# Patient Record
Sex: FEMALE | Race: WHITE | NOT HISPANIC OR LATINO
[De-identification: names, ages, dates, MRNs, and addresses within clinical notes are randomized per-mention and may not be internally consistent; named-entity substitution may affect disease eponyms.]

---

## 2020-02-05 ENCOUNTER — FORM ENCOUNTER (OUTPATIENT)
Age: 70
End: 2020-02-05

## 2020-02-11 ENCOUNTER — FORM ENCOUNTER (OUTPATIENT)
Age: 70
End: 2020-02-11

## 2020-02-12 ENCOUNTER — FORM ENCOUNTER (OUTPATIENT)
Age: 70
End: 2020-02-12

## 2020-02-12 ENCOUNTER — HOSPITAL ENCOUNTER (OUTPATIENT)
Dept: HOSPITAL 74 - JASU-SURG | Age: 70
Discharge: HOME | End: 2020-02-12
Attending: SURGERY
Payer: COMMERCIAL

## 2020-02-12 DIAGNOSIS — C50.411: Primary | ICD-10-CM

## 2020-02-12 DIAGNOSIS — Z17.0: ICD-10-CM

## 2020-02-12 DIAGNOSIS — C50.212: ICD-10-CM

## 2020-02-12 PROCEDURE — 0HBV3ZX EXCISION OF BILATERAL BREAST, PERCUTANEOUS APPROACH, DIAGNOSTIC: ICD-10-PCS

## 2020-02-12 PROCEDURE — A4648 IMPLANTABLE TISSUE MARKER: HCPCS

## 2020-02-13 NOTE — PATH
Surgical Pathology Report



Patient Name:  ZOYA BROWN

Accession #:  

[facility]. Rec. #:  [id number]                                                        

   /Age/Gender:  1950 (Age: 69) / F

Account:  [id number]                                                          

             Location: [facility] SURGICAL

Taken:  2020

Received:  2020

Reported:  2020

Physicians:  DANA Rose M.D.

  



Specimen(s) Received

A: BREAST, RIGHT, 11:00, ULTRASOUND GUIDED CORE BIOPSY 

B: BREAST, LEFT, 11:00, ULTRASOUND GUIDED CORE BIOPSY 





Clinical History

Left palpable mass, right nonpalpable lesion

Mammographic findings, ultrasound findings: Highly suspicious less malignant







Final Diagnosis

A. BREAST, RIGHT, 11:00, ULTRASOUND GUIDED CORE BIOPSY:

INVASIVE DUCTAL CARCINOMA, WELL DIFFERENTIATED, WITH ASSOCIATED

MICROCALCIFICATIONS MEASURING AT LEAST 1 CM IN THIS MATERIAL.

DUCTAL CARCINOMA IN SITU (DCIS), LOW NUCLEAR GRADE, CRIBRIFORM TYPE, WITH

ASSOCIATED MICROCALCIFICATIONS.



B. BREAST, LEFT, 11:00, ULTRASOUND GUIDED CORE BIOPSY:

INVASIVE DUCTAL CARCINOMA, MODERATELY DIFFERENTIATED, WITH ASSOCIATED

MICROCALCIFICATIONS, MEASURING AT LEAST 1.3 CM IN THIS MATERIAL.



Comment: Breast biomarkers pending, findings will be reported separately.

Findings discussed with Dr. Reyna, 20.







***Electronically Signed***

Denise Miranda M.D.



Amendments

Amended:  2020      Previous Signout Date:  2020





Addendum     

Reported: 2020



Addendum Diagnosis

Part A, Right breast

Breast Biomarker studies performed on block "A" at [facility] [place], [place]

([id number]) are as follows:

ER (clone [id number] mouse monoclonal antibody by [employer]): 100% nuclear staining with

strong intensity (Positive).

DC (clone16 mouse monoclonal antibody by [employer]): ~20% nuclear staining with

strong intensity (Positive).

Her2 IHC (EP3 from [employer], formerly known as [id number], using Bond Polymer Refine

detection kit): 2+ (Equivocal).

Ki-67: ~5% (Low proliferative index).



Part B, Left breast

Breast Biomarker studies performed on block "B" at [place], [place]

([id number]) are as follows:

ER (clone 6F11 mouse monoclonal antibody by [employer]): 100% nuclear staining with

strong intensity (Positive).

DC (clone16 mouse monoclonal antibody by [employer]): ~70% nuclear staining with

strong intensity (Positive).

Her2 IHC (EP3 from [employer], formerly known as [id number], using Bond Polymer Refine

detection kit): 2+ (Equivocal).

Ki-67: ~20% (Moderate proliferative index).



HER2 by FISH pending, and findings will be reported separately.





Positive and negative controls (internal if applicable) show appropriate

results.

Formalin fixation and cold ischemic times are within current ASCO/CAP

recommendations for ER, DC and Her2 testing.





 Denise Miranda M.D.  

Addendum     

Reported: 2020



Addendum Diagnosis

HER2 ANALYSIS BY FISH ([id number], [id number]) performed and interpreted

at [facility] shows the following:



Right Breast (Block A)



INTERPRETATION: 

Negative for Her2 amplification





Probe 

Average number of signals/nucleus 

Ratio 

Her2 

2.6 

1.3 

CEP17 

2.0  





Left Breast (Block B) 



INTERPRETATION: 

Negative for Her2 amplification





Probe 

Average number of signals/nucleus 

Ratio 

Her2 

4.6 

1.2 

CEP17 

3.9  

See [facility] reports for additional details.



Addendum Comment











 Denise Miranda M.D.  

 



Gross Description

A. Received in formalin labeled "right 11:00," are 8 tan-yellow, cylindrical

portions of fibroadipose tissue ranging from 0.2-1.4 cm in length and averaging

0.1 cm in diameter. The specimens are submitted in toto in one cassette.



B. Received in formalin labeled "left 11:00," are 4 tan-yellow, cylindrical

portions of fibroadipose tissue ranging from 0.3-1.7 cm in length and averaging

0.1 cm in diameter. The specimens are submitted in toto in one cassette.



Time to formalin fixation: Less than one minute

Total formalin fixation time: Approximately 6 hours.

## 2020-02-25 ENCOUNTER — FORM ENCOUNTER (OUTPATIENT)
Age: 70
End: 2020-02-25

## 2020-03-25 ENCOUNTER — FORM ENCOUNTER (OUTPATIENT)
Age: 70
End: 2020-03-25

## 2020-05-07 ENCOUNTER — FORM ENCOUNTER (OUTPATIENT)
Age: 70
End: 2020-05-07

## 2020-05-11 ENCOUNTER — FORM ENCOUNTER (OUTPATIENT)
Age: 70
End: 2020-05-11

## 2020-05-19 ENCOUNTER — FORM ENCOUNTER (OUTPATIENT)
Age: 70
End: 2020-05-19

## 2020-06-02 ENCOUNTER — FORM ENCOUNTER (OUTPATIENT)
Age: 70
End: 2020-06-02

## 2020-08-25 ENCOUNTER — FORM ENCOUNTER (OUTPATIENT)
Age: 70
End: 2020-08-25

## 2020-09-22 ENCOUNTER — FORM ENCOUNTER (OUTPATIENT)
Age: 70
End: 2020-09-22

## 2021-02-02 ENCOUNTER — NON-APPOINTMENT (OUTPATIENT)
Age: 71
End: 2021-02-02

## 2021-02-02 PROBLEM — Z00.00 ENCOUNTER FOR PREVENTIVE HEALTH EXAMINATION: Status: ACTIVE | Noted: 2021-02-02

## 2021-02-26 ENCOUNTER — NON-APPOINTMENT (OUTPATIENT)
Age: 71
End: 2021-02-26

## 2021-03-01 DIAGNOSIS — C50.411 MALIGNANT NEOPLASM OF UPPER-OUTER QUADRANT OF RIGHT FEMALE BREAST: ICD-10-CM

## 2021-03-01 DIAGNOSIS — C50.212 MALIGNANT NEOPLASM OF UPPER-INNER QUADRANT OF LEFT FEMALE BREAST: ICD-10-CM

## 2021-03-01 DIAGNOSIS — Z92.3 PERSONAL HISTORY OF IRRADIATION: ICD-10-CM

## 2021-03-01 RX ORDER — ANASTROZOLE 1 MG/1
1 TABLET ORAL
Refills: 0 | Status: ACTIVE | COMMUNITY

## 2021-03-04 ENCOUNTER — APPOINTMENT (OUTPATIENT)
Dept: BREAST CENTER | Facility: CLINIC | Age: 71
End: 2021-03-04
Payer: MEDICARE

## 2021-03-04 VITALS — BODY MASS INDEX: 35.44 KG/M2 | WEIGHT: 200 LBS | HEIGHT: 63 IN

## 2021-03-04 DIAGNOSIS — Z87.891 PERSONAL HISTORY OF NICOTINE DEPENDENCE: ICD-10-CM

## 2021-03-04 PROCEDURE — 99214 OFFICE O/P EST MOD 30 MIN: CPT | Mod: 25

## 2021-03-04 PROCEDURE — 19000 PUNCTURE ASPIR CYST BREAST: CPT

## 2021-03-04 NOTE — PHYSICAL EXAM
[Normocephalic] : normocephalic [Atraumatic] : atraumatic [EOMI] : extra ocular movement intact [Supple] : supple [No Supraclavicular Adenopathy] : no supraclavicular adenopathy [No Cervical Adenopathy] : no cervical adenopathy [Examined in the supine and seated position] : examined in the supine and seated position [No dominant masses] : no dominant masses left breast [No Nipple Retraction] : no left nipple retraction [No Nipple Discharge] : no left nipple discharge [___cm] : ~M [unfilled] ~Ucm upper outer quadrant mass [Breast Mass Left Breast ___cm] : no masses [Breast Nipple Inversion] : nipples not inverted [Breast Nipple Retraction] : nipples not retracted [Breast Nipple Flattening] : nipples not flattened [Breast Nipple Fissures] : nipples not fissured [Breast Abnormal Lactation (Galactorrhea)] : no galactorrhea [Breast Abnormal Secretion Bloody Fluid] : no bloody discharge [Breast Abnormal Secretion Serous Fluid] : no serous discharge [Breast Abnormal Secretion Opalescent Fluid] : no milky discharge [No Axillary Lymphadenopathy] : no left axillary lymphadenopathy [No Edema] : no edema [No Rashes] : no rashes [No Ulceration] : no ulceration [de-identified] : On exam, the patient has a obvious bilateral partial mastectomy incisions in the upper inner aspect of the left breast and upper outer aspect of the right breast.  On palpation, she has typical scarring changes underneath the wide excision with some more nodularity underneath the right breast incision.  I performed a directed ultrasound and she did have a seroma and I aspirated about 19 cc of bloody seroma with complete resolution under ultrasound guidance.  She has no axillary, supraclavicular, or cervical adenopathy. [de-identified] : Right breast partial mastectomy incision with nodularity and seroma which was aspirated under ultrasound guidance in the office today. [de-identified] : Left breast partial mastectomy incision with mild fibrosis.

## 2021-03-04 NOTE — HISTORY OF PRESENT ILLNESS
[FreeTextEntry1] : The patient is a 70-year-old postmenopausal nulliparous white female of Lao, Yi, and Frisian descent.  She underwent menopause at age 51 and never took any hormone replacement therapy.  She never had any breast biopsies or any atypia.  She has no family history of breast or ovarian cancer.  She felt a palpable mass in the upper inner aspect of the left breast in December 2019 and underwent a bilateral mammography on February 3, 2020 at [facility] still imaging showing suspicious spiculated densities in the left breast upper inner quadrant and right breast upper outer quadrant.  Diagnostic mammography and ultrasound performed on February 5, 2020 showed a left breast 2.9 x 2.1 x 1.9 cm mass in the left breast 11:00 region 9 cm from the nipple and a right breast 1.3 x 1.1 x 1.4 cm irregular mass in the 11 o'clock position 9 cm from the nipple.  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  Both tumors were ER/MA positive HER-2/galo negative.  MRI showed localized disease and her surgery was delayed due to the COVID crisis and she was placed on Arimidex.  We took her to the operating room on May 12, 2020 for bilateral partial mastectomies, bilateral sentinel lymph node biopsies, and bilateral intraoperative radiation.  Final pathology on the right showed a 1.6 cm invasive duct cancer with 1 out of 2 positive sentinel nodes with macrometastasis measuring 1.6 cm.  The left breast cancer was a moderately differentiated cancer measuring 2.1 cm with 1 negative sentinel lymph node.  Both tumors were pathologic prognostic stage IA breast cancers.  She had no extensive intraductal component.  She was seen by Dr. Small and underwent an Oncotype in the right breast cancer had an Oncotype score of 13 and the left breast cancer had an Oncotype score 14.  She was just kept on Arimidex and did undergo external beam radiation over the right breast through Dr. Zuleta to which finished on August 3, 2020.  She comes in for routine follow-up but has noted some nodularity underneath the right breast wide excision site.

## 2021-03-04 NOTE — PAST MEDICAL HISTORY
[Postmenopausal] : The patient is postmenopausal [Menopause Age____] : age at menopause was [unfilled] [History of Hormone Replacement Treatment] : has no history of hormone replacement treatment [Total Preg ___] : G[unfilled] [Live Births ___] : P[unfilled]

## 2021-03-04 NOTE — ASSESSMENT
[FreeTextEntry1] : The patient is a 70-year-old postmenopausal nulliparous white female of Sinhala, Kyrgyz, and Faroese descent.  She underwent menopause at age 51 and never took any hormone replacement therapy.  She never had any breast biopsies or any atypia.  She has no family history of breast or ovarian cancer.  She felt a palpable mass in the upper inner aspect of the left breast in December 2019 and underwent a bilateral mammography on February 3, 2020 at [facility] still imaging showing suspicious spiculated densities in the left breast upper inner quadrant and right breast upper outer quadrant.  Diagnostic mammography and ultrasound performed on February 5, 2020 showed a left breast 2.9 x 2.1 x 1.9 cm mass in the left breast 11:00 region 9 cm from the nipple and a right breast 1.3 x 1.1 x 1.4 cm irregular mass in the 11 o'clock position 9 cm from the nipple.  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  Both tumors were ER/FL positive HER-2/galo negative.  MRI showed localized disease and her surgery was delayed due to the COVID crisis and she was placed on Arimidex.  We took her to the operating room on May 12, 2020 for bilateral partial mastectomies, bilateral sentinel lymph node biopsies, and bilateral intraoperative radiation.  Final pathology on the right showed a 1.6 cm invasive duct cancer with 1 out of 2 positive sentinel nodes with macrometastasis measuring 1.6 cm.  The left breast cancer was a moderately differentiated cancer measuring 2.1 cm with 1 negative sentinel lymph node.  Both tumors were pathologic prognostic stage IA breast cancers.  She had no extensive intraductal component.  She was seen by Dr. Small and underwent an Oncotype in the right breast cancer had an Oncotype score of 13 and the left breast cancer had an Oncotype score 14.  She was just kept on Arimidex and did undergo external beam radiation over the right breast through Dr. Zuleta to which finished on August 3, 2020.  On exam, she has some fibrosis underneath both wide excision scars with some nodularity underneath the right breast upper outer quadrant incision.  I performed a directed ultrasound and she did have a seroma and I aspirated about 19 cc of bloody seroma with complete resolution under ultrasound guidance.  The patient was reassured and is due for a mammography and ultrasound now which needs to be delayed about a month because she just underwent her COVID vaccination.  If that is unchanged and negative she should follow-up again in 6 months around September 2021.  Will remain on Arimidex.

## 2021-03-04 NOTE — REASON FOR VISIT
[Follow-Up: _____] : a [unfilled] follow-up visit [FreeTextEntry1] : The patient comes in for routine follow-up with a history of bilateral synchronous breast cancers diagnosed at the beginning of 2020 in the right breast upper outer quadrant and left breast upper inner quadrant for which she underwent bilateral partial mastectomies, bilateral sentinel lymph node biopsies, and bilateral intraoperative radiation which was performed on May 12, 2020.  Final pathology showed bilateral pathologic prognostic stage Ia breast cancers and she did have a positive sentinel lymph node on the right side and ended up getting external beam radiation on the right side.  Oncotype recurrence score was 13 and 14 and she was just placed on Arimidex.

## 2021-04-01 ENCOUNTER — NON-APPOINTMENT (OUTPATIENT)
Age: 71
End: 2021-04-01

## 2021-04-05 ENCOUNTER — RESULT REVIEW (OUTPATIENT)
Age: 71
End: 2021-04-05

## 2021-04-05 ENCOUNTER — TRANSCRIPTION ENCOUNTER (OUTPATIENT)
Age: 71
End: 2021-04-05

## 2021-04-05 ENCOUNTER — APPOINTMENT (OUTPATIENT)
Dept: BREAST CENTER | Facility: CLINIC | Age: 71
End: 2021-04-05
Payer: MEDICARE

## 2021-04-05 VITALS — BODY MASS INDEX: 35.44 KG/M2 | HEIGHT: 63 IN | WEIGHT: 200 LBS

## 2021-04-05 DIAGNOSIS — N63.0 UNSPECIFIED LUMP IN UNSPECIFIED BREAST: ICD-10-CM

## 2021-04-05 DIAGNOSIS — Z90.13 ACQUIRED ABSENCE OF BILATERAL BREASTS AND NIPPLES: ICD-10-CM

## 2021-04-05 PROCEDURE — 99214 OFFICE O/P EST MOD 30 MIN: CPT

## 2021-04-05 NOTE — HISTORY OF PRESENT ILLNESS
[FreeTextEntry1] : The patient is a 71-year-old postmenopausal nulliparous white female of Wolof, Kiswahili, and Greenlandic descent.  She underwent menopause at age 51 and never took any hormone replacement therapy.  She never had any breast biopsies or any atypia.  She has no family history of breast or ovarian cancer.  She felt a palpable mass in the upper inner aspect of the left breast in December 2019 and underwent a bilateral mammography on February 3, 2020 at [facility] still [facility] showing suspicious spiculated densities in the left breast upper inner quadrant and right breast upper outer quadrant.  Diagnostic mammography and ultrasound performed on February 5, 2020 showed a left breast 2.9 x 2.1 x 1.9 cm mass in the left breast 11:00 region 9 cm from the nipple and a right breast 1.3 x 1.1 x 1.4 cm irregular mass in the 11 o'clock position 9 cm from the nipple.  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  Both tumors were ER/LA positive HER-2/galo negative.  MRI showed localized disease and her surgery was delayed due to the COVID crisis and she was placed on Arimidex.  We took her to the operating room on May 12, 2020 for bilateral partial mastectomies, bilateral sentinel lymph node biopsies, and bilateral intraoperative radiation.  Final pathology on the right showed a 1.6 cm invasive duct cancer with 1 out of 2 positive sentinel nodes with macrometastasis measuring 1.6 cm.  The left breast cancer was a moderately differentiated cancer measuring 2.1 cm with 1 negative sentinel lymph node.  Both tumors were pathologic prognostic stage IA breast cancers.  She had no extensive intraductal component.  She was seen by Dr. Small and underwent an Oncotype in the right breast cancer had an Oncotype score of 13 and the left breast cancer had an Oncotype score 14.  She was just kept on Arimidex and did undergo external beam radiation over the right breast through Dr. Zuleta to which finished on August 3, 2020.  She underwent a bilateral mammography and ultrasound on April 5, 2021 which showed a 6 mm hypoechoic density around the right breast 8-9 o'clock region 5 cm from the nipple for which ultrasound-guided core biopsy is being recommended.

## 2021-04-05 NOTE — ASSESSMENT
[FreeTextEntry1] : The patient is a 71-year-old postmenopausal nulliparous white female of Tajik, Serbian, and Maltese descent.  She underwent menopause at age 51 and never took any hormone replacement therapy.  She never had any breast biopsies or any atypia.  She has no family history of breast or ovarian cancer.  She felt a palpable mass in the upper inner aspect of the left breast in December 2019 and underwent a bilateral mammography on February 3, 2020 at [place] still [facility] showing suspicious spiculated densities in the left breast upper inner quadrant and right breast upper outer quadrant.  Diagnostic mammography and ultrasound performed on February 5, 2020 showed a left breast 2.9 x 2.1 x 1.9 cm mass in the left breast 11:00 region 9 cm from the nipple and a right breast 1.3 x 1.1 x 1.4 cm irregular mass in the 11 o'clock position 9 cm from the nipple.  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  Both tumors were ER/WI positive HER-2/galo negative.  MRI showed localized disease and her surgery was delayed due to the COVID crisis and she was placed on Arimidex.  We took her to the operating room on May 12, 2020 for bilateral partial mastectomies, bilateral sentinel lymph node biopsies, and bilateral intraoperative radiation.  Final pathology on the right showed a 1.6 cm invasive duct cancer with 1 out of 2 positive sentinel nodes with macrometastasis measuring 1.6 cm.  The left breast cancer was a moderately differentiated cancer measuring 2.1 cm with 1 negative sentinel lymph node.  Both tumors were pathologic prognostic stage IA breast cancers.  She had no extensive intraductal component.  She was seen by Dr. Small and underwent an Oncotype in the right breast cancer had an Oncotype score of 13 and the left breast cancer had an Oncotype score 14.  She was just kept on Arimidex and did undergo external beam radiation over the right breast through Dr. Zuleta to which finished on August 3, 2020.  On exam, she has some fibrosis underneath both wide excision scars with some nodularity underneath the right breast upper outer quadrant incision.  Underwent a bilateral mammography and ultrasound here at [facility] on April 5, 2021 showing some nodularity around the 8:00 region of the right breast measure about 6 mm in size 5 cm from the nipple for which ultrasound-guided core biopsy is being recommended.  I reviewed the films and I think this is fat necrosis but I agree with the recommendation.  If this is negative, she should follow-up in 6 months around November 2021 and will be due for another bilateral diagnostic mammography and directed right breast ultrasound at that time.  She will remain on Arimidex.

## 2021-04-05 NOTE — REASON FOR VISIT
[Follow-Up: _____] : a [unfilled] follow-up visit [FreeTextEntry1] : The patient comes in for an interval follow-up with a history of bilateral synchronous breast cancers diagnosed at the beginning of 2020 in the right breast upper outer quadrant and left breast upper inner quadrant for which she underwent bilateral partial mastectomies, bilateral sentinel lymph node biopsies, and bilateral intraoperative radiation which was performed on May 12, 2020.  Final pathology showed bilateral pathologic prognostic stage IA breast cancers and she did have a positive sentinel lymph node on the right side and ended up getting external beam radiation on the right side.  Oncotype recurrence score was 13 and 14 and she was just placed on Arimidex.

## 2021-04-05 NOTE — PHYSICAL EXAM
[Normocephalic] : normocephalic [Atraumatic] : atraumatic [EOMI] : extra ocular movement intact [Supple] : supple [No Supraclavicular Adenopathy] : no supraclavicular adenopathy [No Cervical Adenopathy] : no cervical adenopathy [Examined in the supine and seated position] : examined in the supine and seated position [No dominant masses] : no dominant masses in right breast  [No dominant masses] : no dominant masses left breast [No Nipple Retraction] : no left nipple retraction [No Nipple Discharge] : no left nipple discharge [Breast Mass Left Breast ___cm] : no masses [Breast Nipple Inversion] : nipples not inverted [Breast Nipple Retraction] : nipples not retracted [Breast Nipple Flattening] : nipples not flattened [Breast Nipple Fissures] : nipples not fissured [Breast Abnormal Lactation (Galactorrhea)] : no galactorrhea [Breast Abnormal Secretion Bloody Fluid] : no bloody discharge [Breast Abnormal Secretion Serous Fluid] : no serous discharge [Breast Abnormal Secretion Opalescent Fluid] : no milky discharge [No Axillary Lymphadenopathy] : no left axillary lymphadenopathy [No Edema] : no edema [No Rashes] : no rashes [No Ulceration] : no ulceration [de-identified] : On exam, the patient has a obvious bilateral partial mastectomy incisions in the upper inner aspect of the left breast and upper outer aspect of the right breast.  On palpation, she has typical scarring changes underneath the wide excision with some more nodularity underneath the right breast incision.  She has no axillary, supraclavicular, or cervical adenopathy. [de-identified] : Right breast partial mastectomy incision with nodularity in lower outer quadrant. [de-identified] : Left breast partial mastectomy incision with mild fibrosis.

## 2021-04-13 ENCOUNTER — RESULT REVIEW (OUTPATIENT)
Age: 71
End: 2021-04-13

## 2021-04-15 ENCOUNTER — NON-APPOINTMENT (OUTPATIENT)
Age: 71
End: 2021-04-15

## 2021-10-18 DIAGNOSIS — N63.10 UNSPECIFIED LUMP IN THE RIGHT BREAST, UNSPECIFIED QUADRANT: ICD-10-CM

## 2021-10-19 ENCOUNTER — NON-APPOINTMENT (OUTPATIENT)
Age: 71
End: 2021-10-19

## 2021-10-20 ENCOUNTER — RESULT REVIEW (OUTPATIENT)
Age: 71
End: 2021-10-20

## 2021-10-20 ENCOUNTER — APPOINTMENT (OUTPATIENT)
Dept: BREAST CENTER | Facility: CLINIC | Age: 71
End: 2021-10-20
Payer: MEDICARE

## 2021-10-20 PROCEDURE — 99213 OFFICE O/P EST LOW 20 MIN: CPT

## 2021-10-20 NOTE — PAST MEDICAL HISTORY
[Postmenopausal] : The patient is postmenopausal [Menopause Age____] : age at menopause was [unfilled] [Total Preg ___] : G[unfilled] [Live Births ___] : P[unfilled]  [History of Hormone Replacement Treatment] : has no history of hormone replacement treatment

## 2021-10-20 NOTE — ASSESSMENT
[FreeTextEntry1] : The patient is a 71-year-old postmenopausal nulliparous white female of Malay, Syriac, and Pashto descent.  She underwent menopause at age 51 and never took any hormone replacement therapy.  She never had any breast biopsies or any atypia.  She has no family history of breast or ovarian cancer.  She felt a palpable mass in the upper inner aspect of the left breast in December 2019 and underwent a bilateral mammography on February 3, 2020 at [facility] still [facility] showing suspicious spiculated densities in the left breast upper inner quadrant and right breast upper outer quadrant.  Diagnostic mammography and ultrasound performed on February 5, 2020 showed a left breast 2.9 x 2.1 x 1.9 cm mass in the left breast 11:00 region 9 cm from the nipple and a right breast 1.3 x 1.1 x 1.4 cm irregular mass in the 11 o'clock position 9 cm from the nipple.  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  Both tumors were ER/MT positive HER-2/galo negative.  MRI showed localized disease and her surgery was delayed due to the COVID crisis and she was placed on Arimidex.  We took her to the operating room on May 12, 2020 for bilateral partial mastectomies, bilateral sentinel lymph node biopsies, and bilateral intraoperative radiation.  Final pathology on the right showed a 1.6 cm invasive duct cancer with 1 out of 2 positive sentinel nodes with macrometastasis measuring 1.6 cm.  The left breast cancer was a moderately differentiated cancer measuring 2.1 cm with 1 negative sentinel lymph node.  Both tumors were pathologic prognostic stage IA breast cancers.  She had no extensive intraductal component.  She was seen by Dr. Small and underwent an Oncotype and the right breast cancer had an Oncotype score of 13 and the left breast cancer had an Oncotype score 14.  She was just kept on Arimidex and did undergo external beam radiation over the right breast through Dr. Zuleta to which finished on August 3, 2020.  On exam, she has some fibrosis underneath both wide excision scars with some nodularity underneath the right breast upper outer quadrant incision can Elliot to intraoperative and external beam radiation over the right breast.  She underwent a bilateral mammography and ultrasound at [facility] on April 5, 2021 showing bilateral scarring changes from her partial mastectomies and some nodularity around the 8:00 region of the right breast measuring about 6 mm in size 5 cm from the nipple for which ultrasound-guided core biopsy performed on April 13, 2021 just showing fibroadipose tissue with fat necrosis and granulation tissue.  The patient underwent a diagnostic bilateral mammography and right breast ultrasound today on October 20, 2021 showing stable changes.  She was reassured and should follow-up again in 6 months in April 2022 and her routine bilateral mammography and ultrasound will be due at that time and she was given prescriptions.  She will remain on Arimidex.

## 2021-10-20 NOTE — PHYSICAL EXAM
[Normocephalic] : normocephalic [Atraumatic] : atraumatic [EOMI] : extra ocular movement intact [Supple] : supple [No Supraclavicular Adenopathy] : no supraclavicular adenopathy [No Cervical Adenopathy] : no cervical adenopathy [Examined in the supine and seated position] : examined in the supine and seated position [No dominant masses] : no dominant masses in right breast  [No dominant masses] : no dominant masses left breast [No Nipple Retraction] : no left nipple retraction [No Nipple Discharge] : no left nipple discharge [Breast Mass Left Breast ___cm] : no masses [No Axillary Lymphadenopathy] : no left axillary lymphadenopathy [No Edema] : no edema [No Rashes] : no rashes [No Ulceration] : no ulceration [Breast Nipple Inversion] : nipples not inverted [Breast Nipple Retraction] : nipples not retracted [Breast Nipple Flattening] : nipples not flattened [Breast Nipple Fissures] : nipples not fissured [Breast Abnormal Lactation (Galactorrhea)] : no galactorrhea [Breast Abnormal Secretion Bloody Fluid] : no bloody discharge [Breast Abnormal Secretion Serous Fluid] : no serous discharge [Breast Abnormal Secretion Opalescent Fluid] : no milky discharge [de-identified] : On exam, the patient has a obvious bilateral partial mastectomy incisions in the upper inner aspect of the left breast and upper outer aspect of the right breast.  On palpation, she has typical scarring changes underneath the wide excision with some more nodularity underneath the right breast incision.  She has no axillary, supraclavicular, or cervical adenopathy. [de-identified] : Right breast partial mastectomy incision with nodularity in lower outer quadrant. [de-identified] : Left breast partial mastectomy incision with mild fibrosis.

## 2021-10-20 NOTE — HISTORY OF PRESENT ILLNESS
[FreeTextEntry1] : The patient is a 71-year-old postmenopausal nulliparous white female of Ukrainian, Icelandic, and Romansh descent.  She underwent menopause at age 51 and never took any hormone replacement therapy.  She never had any breast biopsies or any atypia.  She has no family history of breast or ovarian cancer.  She felt a palpable mass in the upper inner aspect of the left breast in December 2019 and underwent a bilateral mammography on February 3, 2020 at [facility] still [facility] showing suspicious spiculated densities in the left breast upper inner quadrant and right breast upper outer quadrant.  Diagnostic mammography and ultrasound performed on February 5, 2020 showed a left breast 2.9 x 2.1 x 1.9 cm mass in the left breast 11:00 region 9 cm from the nipple and a right breast 1.3 x 1.1 x 1.4 cm irregular mass in the 11 o'clock position 9 cm from the nipple.  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  Both tumors were ER/[place] positive HER-2/galo negative.  MRI showed localized disease and her surgery was delayed due to the COVID crisis and she was placed on Arimidex.  We took her to the operating room on May 12, 2020 for bilateral partial mastectomies, bilateral sentinel lymph node biopsies, and bilateral intraoperative radiation.  Final pathology on the right showed a 1.6 cm invasive duct cancer with 1 out of 2 positive sentinel nodes with macrometastasis measuring 1.6 cm.  The left breast cancer was a moderately differentiated cancer measuring 2.1 cm with 1 negative sentinel lymph node.  Both tumors were pathologic prognostic stage IA breast cancers.  She had no extensive intraductal component.  She was seen by Dr. Small and underwent an Oncotype in the right breast cancer had an Oncotype score of 13 and the left breast cancer had an Oncotype score 14.  She was just kept on Arimidex and did undergo external beam radiation over the right breast through Dr. Zuleta to which finished on August 3, 2020.  She underwent a bilateral mammography and ultrasound on April 5, 2021 which showed a 6 mm hypoechoic density around the right breast 8-9 o'clock region 5 cm from the nipple for which ultrasound-guided core biopsy was performed on April 13, 2021 just showing fibroadipose tissue with fat necrosis and granulation tissue.  She comes in now for routine follow-up and continues to get yearly mammography and ultrasound.

## 2022-04-22 ENCOUNTER — APPOINTMENT (OUTPATIENT)
Dept: BREAST CENTER | Facility: CLINIC | Age: 72
End: 2022-04-22
Payer: MEDICARE

## 2022-04-22 ENCOUNTER — RESULT REVIEW (OUTPATIENT)
Age: 72
End: 2022-04-22

## 2022-04-22 DIAGNOSIS — R92.1 MAMMOGRAPHIC CALCIFICATION FOUND ON DIAGNOSTIC IMAGING OF BREAST: ICD-10-CM

## 2022-04-22 PROCEDURE — 99213 OFFICE O/P EST LOW 20 MIN: CPT

## 2022-04-22 NOTE — ASSESSMENT
[FreeTextEntry1] : The patient is a 72-year-old postmenopausal nulliparous white female of Welsh, Nepali, and German descent.  She underwent menopause at age 51 and never took any hormone replacement therapy.  She never had any breast biopsies or any atypia.  She has no family history of breast or ovarian cancer.  She felt a palpable mass in the upper inner aspect of the left breast in December 2019 and underwent a bilateral mammography on February 3, 2020 at [facility] still [facility] showing suspicious spiculated densities in the left breast upper inner quadrant and right breast upper outer quadrant.  Diagnostic mammography and ultrasound performed on February 5, 2020 showed a left breast 2.9 x 2.1 x 1.9 cm mass in the left breast 11:00 region 9 cm from the nipple and a right breast 1.3 x 1.1 x 1.4 cm irregular mass in the 11 o'clock position 9 cm from the nipple.  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  Both tumors were ER/OR positive HER-2/galo negative.  MRI showed localized disease and her surgery was delayed due to the COVID crisis and she was placed on Arimidex.  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  We took her to the operating room on May 12, 2020 for bilateral partial mastectomies, bilateral sentinel lymph node biopsies, and bilateral intraoperative radiation.  Final pathology on the right showed a 1.6 cm invasive duct cancer with 1 out of 2 positive sentinel nodes with macrometastasis measuring 1.6 cm.  The left breast cancer was a moderately differentiated cancer measuring 2.1 cm with 1 negative sentinel lymph node.  Both tumors were pathologic prognostic stage IA breast cancers.  She had no extensive intraductal component.  She was seen by Dr. Small and underwent an Oncotype and the right breast cancer had an Oncotype score of 13 and the left breast cancer had an Oncotype score 14.  She was just kept on Arimidex and did undergo external beam radiation over the right breast through Dr. Zuleta to which finished on August 3, 2020.  She underwent a bilateral mammography and ultrasound at [facility] on April 5, 2021 showing bilateral scarring changes from her partial mastectomies and some nodularity around the 8:00 region of the right breast measuring about 6 mm in size 5 cm from the nipple for which ultrasound-guided core biopsy performed on April 13, 2021 just showing fibroadipose tissue with fat necrosis and granulation tissue.  The patient underwent another bilateral mammography and right breast ultrasound on October 20, 2021 showing stable changes.   She then underwent a follow-up diagnostic right breast mammography which was reviewed from April 22, 2022 showing some probable benign calcifications in the upper outer aspect of the right breast for which follow-up mammography is again recommended in 6 months.   On exam, she has some fibrosis underneath both wide excision scars with some nodularity underneath the right breast upper outer quadrant incision due to the intraoperative and external beam radiation over the right breast.  She was reassured and should follow-up again in 6 months in October 2022 and will be due for her bilateral mammography and ultrasound at that time and she was given prescriptions.  She will remain on Arimidex.

## 2022-04-22 NOTE — HISTORY OF PRESENT ILLNESS
[FreeTextEntry1] : The patient is a 72-year-old postmenopausal nulliparous white female of Danish, German, and Czech descent.  She underwent menopause at age 51 and never took any hormone replacement therapy.  She never had any breast biopsies or any atypia.  She has no family history of breast or ovarian cancer.  She felt a palpable mass in the upper inner aspect of the left breast in December 2019 and underwent a bilateral mammography on February 3, 2020 at [facility] still imaging showing suspicious spiculated densities in the left breast upper inner quadrant and right breast upper outer quadrant.  Diagnostic mammography and ultrasound performed on February 5, 2020 showed a left breast 2.9 x 2.1 x 1.9 cm mass in the left breast 11:00 region 9 cm from the nipple and a right breast 1.3 x 1.1 x 1.4 cm irregular mass in the 11 o'clock position 9 cm from the nipple.  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  Both tumors were ER/[place] positive HER-2/galo negative.  MRI showed localized disease and her surgery was delayed due to the COVID crisis and she was placed on Arimidex.   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  We took her to the operating room on May 12, 2020 for bilateral partial mastectomies, bilateral sentinel lymph node biopsies, and bilateral intraoperative radiation.  Final pathology on the right showed a 1.6 cm invasive duct cancer with 1 out of 2 positive sentinel nodes with macrometastasis measuring 1.6 cm.  The left breast cancer was a moderately differentiated cancer measuring 2.1 cm with 1 negative sentinel lymph node.  Both tumors were pathologic prognostic stage IA breast cancers.  She had no extensive intraductal component.  She was seen by Dr. Small and underwent an Oncotype in the right breast cancer had an Oncotype score of 13 and the left breast cancer had an Oncotype score 14.  She was just kept on Arimidex and did undergo external beam radiation over the right breast through Dr. Zuleta to which finished on August 3, 2020.  She underwent a bilateral mammography and ultrasound on April 5, 2021 which showed a 6 mm hypoechoic density around the right breast 8-9 o'clock region 5 cm from the nipple for which ultrasound-guided core biopsy was performed on April 13, 2021 just showing fibroadipose tissue with fat necrosis and granulation tissue.  She comes in now for routine follow-up and continues to get yearly mammography and ultrasound.

## 2022-04-22 NOTE — PHYSICAL EXAM
[Normocephalic] : normocephalic [Atraumatic] : atraumatic [EOMI] : extra ocular movement intact [Supple] : supple [No Supraclavicular Adenopathy] : no supraclavicular adenopathy [No Cervical Adenopathy] : no cervical adenopathy [Examined in the supine and seated position] : examined in the supine and seated position [No dominant masses] : no dominant masses in right breast  [No dominant masses] : no dominant masses left breast [No Nipple Retraction] : no left nipple retraction [No Nipple Discharge] : no left nipple discharge [No Axillary Lymphadenopathy] : no left axillary lymphadenopathy [No Edema] : no edema [No Rashes] : no rashes [No Ulceration] : no ulceration [Breast Nipple Inversion] : nipples not inverted [Breast Nipple Retraction] : nipples not retracted [Breast Nipple Flattening] : nipples not flattened [Breast Nipple Fissures] : nipples not fissured [Breast Abnormal Lactation (Galactorrhea)] : no galactorrhea [Breast Abnormal Secretion Bloody Fluid] : no bloody discharge [Breast Abnormal Secretion Serous Fluid] : no serous discharge [Breast Abnormal Secretion Opalescent Fluid] : no milky discharge [de-identified] : On exam, the patient has a obvious bilateral partial mastectomy incisions in the upper inner aspect of the left breast and upper outer aspect of the right breast.  On palpation, she has typical scarring changes underneath the wide excision with some more nodularity underneath the right breast incision.  She has no axillary, supraclavicular, or cervical adenopathy. [de-identified] : Right breast partial mastectomy incision with  moderate fibrosis in the upper outer quadrant secondary to intraoperative and external beam radiation. [de-identified] : Left breast partial mastectomy incision with mild fibrosis secondary to intraoperative radiation. [de-identified] : Bilateral fibrosis underneath the partial mastectomy incisions secondary to intraoperative radiation

## 2022-10-27 ENCOUNTER — RESULT REVIEW (OUTPATIENT)
Age: 72
End: 2022-10-27

## 2022-10-28 ENCOUNTER — APPOINTMENT (OUTPATIENT)
Dept: BREAST CENTER | Facility: CLINIC | Age: 72
End: 2022-10-28

## 2022-10-28 PROCEDURE — 99213 OFFICE O/P EST LOW 20 MIN: CPT

## 2022-10-28 NOTE — HISTORY OF PRESENT ILLNESS
[FreeTextEntry1] : The patient is a 72-year-old postmenopausal nulliparous white female of Icelandic, Pashto, and Khmer descent.  She underwent menopause at age 51 and never took any hormone replacement therapy.  She never had any breast biopsies or any atypia.  She has no family history of breast or ovarian cancer.  She felt a palpable mass in the upper inner aspect of the left breast in December 2019 and underwent a bilateral mammography on February 3, 2020 at [facility] showing suspicious spiculated densities in the left breast upper inner quadrant and right breast upper outer quadrant.  Diagnostic mammography and ultrasound performed on February 5, 2020 showed a left breast 2.9 x 2.1 x 1.9 cm mass in the left breast 11:00 region 9 cm from the nipple and a right breast 1.3 x 1.1 x 1.4 cm irregular mass in the 11 o'clock position 9 cm from the nipple.  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  Both tumors were ER/[place] positive HER-2/galo negative.  MRI showed localized disease and her surgery was delayed due to the COVID crisis and she was placed on Arimidex.   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  We took her to the operating room on May 12, 2020 for bilateral partial mastectomies, bilateral sentinel lymph node biopsies, and bilateral intraoperative radiation.  Final pathology on the right showed a 1.6 cm invasive duct cancer with 1 out of 2 positive sentinel nodes with macrometastasis measuring 1.6 cm.  The left breast cancer was a moderately differentiated cancer measuring 2.1 cm with 1 negative sentinel lymph node.  Both tumors were pathologic prognostic stage IA breast cancers.  She had no extensive intraductal component.  She was seen by Dr. Small and underwent an Oncotype in the right breast cancer had an Oncotype score of 13 and the left breast cancer had an Oncotype score 14.  She was just kept on Arimidex and did undergo external beam radiation over the right breast through Dr. Zuleta to which finished on August 3, 2020.  She underwent a bilateral mammography and ultrasound on April 5, 2021 which showed a 6 mm hypoechoic density around the right breast 8-9 o'clock region 5 cm from the nipple for which ultrasound-guided core biopsy was performed on April 13, 2021 just showing fibroadipose tissue with fat necrosis and granulation tissue.  She comes in now for routine follow-up and continues to get yearly mammography and ultrasound.

## 2022-10-28 NOTE — PHYSICAL EXAM
[Normocephalic] : normocephalic [Atraumatic] : atraumatic [EOMI] : extra ocular movement intact [Supple] : supple [No Supraclavicular Adenopathy] : no supraclavicular adenopathy [No Cervical Adenopathy] : no cervical adenopathy [Examined in the supine and seated position] : examined in the supine and seated position [No dominant masses] : no dominant masses in right breast  [No dominant masses] : no dominant masses left breast [No Nipple Retraction] : no left nipple retraction [No Nipple Discharge] : no left nipple discharge [No Axillary Lymphadenopathy] : no left axillary lymphadenopathy [No Edema] : no edema [No Rashes] : no rashes [No Ulceration] : no ulceration [Breast Nipple Inversion] : nipples not inverted [Breast Nipple Retraction] : nipples not retracted [Breast Nipple Flattening] : nipples not flattened [Breast Nipple Fissures] : nipples not fissured [Breast Abnormal Lactation (Galactorrhea)] : no galactorrhea [Breast Abnormal Secretion Bloody Fluid] : no bloody discharge [Breast Abnormal Secretion Serous Fluid] : no serous discharge [Breast Abnormal Secretion Opalescent Fluid] : no milky discharge [de-identified] : On exam, the patient has a obvious bilateral partial mastectomy incisions in the upper inner aspect of the left breast and upper outer aspect of the right breast.  On palpation, she has typical scarring changes underneath the wide excision with some more nodularity underneath the right breast incision.  She has no axillary, supraclavicular, or cervical adenopathy. [de-identified] : Right breast partial mastectomy incision with  moderate fibrosis in the upper outer quadrant secondary to intraoperative and external beam radiation. [de-identified] : Left breast partial mastectomy incision with mild fibrosis secondary to intraoperative radiation. [de-identified] : Bilateral fibrosis underneath the partial mastectomy incisions secondary to intraoperative radiation

## 2022-10-28 NOTE — ASSESSMENT
[FreeTextEntry1] : The patient is a 72-year-old postmenopausal nulliparous white female of Romanian, Khmer, and Greek descent.  She underwent menopause at age 51 and never took any hormone replacement therapy.  She never had any breast biopsies or any atypia.  She has no family history of breast or ovarian cancer.  She felt a palpable mass in the upper inner aspect of the left breast in December 2019 and underwent a bilateral mammography on February 3, 2020 at [facility] showing suspicious spiculated densities in the left breast upper inner quadrant and right breast upper outer quadrant.  Diagnostic mammography and ultrasound performed on February 5, 2020 showed a left breast 2.9 x 2.1 x 1.9 cm mass in the left breast 11:00 region 9 cm from the nipple and a right breast 1.3 x 1.1 x 1.4 cm irregular mass in the 11 o'clock position 9 cm from the nipple.  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  Both tumors were ER/[place] positive HER-2/galo negative.  MRI showed localized disease and her surgery was delayed due to the COVID crisis and she was placed on Arimidex.  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  We took her to the operating room on May 12, 2020 for bilateral partial mastectomies, bilateral sentinel lymph node biopsies, and bilateral intraoperative radiation.  Final pathology on the right showed a 1.6 cm invasive duct cancer with 1 out of 2 positive sentinel nodes with macrometastasis measuring 1.6 cm.  The left breast cancer was a moderately differentiated cancer measuring 2.1 cm with 1 negative sentinel lymph node.  Both tumors were pathologic prognostic stage IA breast cancers.  She had no extensive intraductal component.  She was seen by Dr. Small and underwent an Oncotype and the right breast cancer had an Oncotype score of 13 and the left breast cancer had an Oncotype score 14.  She was just kept on Arimidex and did undergo external beam radiation over the right breast through Dr. Zuleta which finished on August 3, 2020.  She underwent a bilateral mammography and ultrasound at [facility] on April 5, 2021 showing bilateral scarring changes from her partial mastectomies and some nodularity around the 8:00 region of the right breast measuring about 6 mm in size 5 cm from the nipple for which ultrasound-guided core biopsy performed on April 13, 2021 just showing fibroadipose tissue with fat necrosis and granulation tissue.  The patient underwent her last bilateral mammography and ultrasound which was reviewed from October 27, 2022 performed at [facility] showing stable changes.   On exam, she has some fibrosis underneath both wide excision scars with some nodularity underneath the right breast upper outer quadrant incision due to the intraoperative and external beam radiation over the right breast.  She was reassured and should follow-up again in 6 months in April 2023.  She will be due for her next bilateral mammography and ultrasound in October 2023 and she was given prescriptions.  She will remain on Arimidex and continue follow up with Dr. Small.

## 2023-04-14 ENCOUNTER — NON-APPOINTMENT (OUTPATIENT)
Age: 73
End: 2023-04-14

## 2023-04-19 ENCOUNTER — APPOINTMENT (OUTPATIENT)
Dept: BREAST CENTER | Facility: CLINIC | Age: 73
End: 2023-04-19
Payer: MEDICARE

## 2023-04-19 VITALS
HEIGHT: 63.5 IN | TEMPERATURE: 97.8 F | BODY MASS INDEX: 33.07 KG/M2 | OXYGEN SATURATION: 99 % | HEART RATE: 78 BPM | WEIGHT: 189 LBS

## 2023-04-19 PROCEDURE — 99213 OFFICE O/P EST LOW 20 MIN: CPT

## 2023-04-19 NOTE — ASSESSMENT
[FreeTextEntry1] : The patient is a 73-year-old postmenopausal nulliparous white female of Telugu, Danish, and Hungarian descent.  She underwent menopause at age 51 and never took any hormone replacement therapy.  She never had any breast biopsies or any atypia.  She has no family history of breast or ovarian cancer.  She felt a palpable mass in the upper inner aspect of the left breast in December 2019 and underwent a bilateral mammography on February 3, 2020 at [facility] showing suspicious spiculated densities in the left breast upper inner quadrant and right breast upper outer quadrant.  Diagnostic mammography and ultrasound performed on February 5, 2020 showed a left breast 2.9 x 2.1 x 1.9 cm mass in the left breast 11:00 region 9 cm from the nipple and a right breast 1.3 x 1.1 x 1.4 cm irregular mass in the 11 o'clock position 9 cm from the nipple.  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  Both tumors were ER/[place] positive HER-2/galo negative.  MRI showed localized disease and her surgery was delayed due to the COVID crisis and she was placed on Arimidex.  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  We took her to the operating room on May 12, 2020 for bilateral partial mastectomies, bilateral sentinel lymph node biopsies, and bilateral intraoperative radiation.  Final pathology on the right showed a 1.6 cm invasive duct cancer with 1 out of 2 positive sentinel nodes with macrometastasis measuring 1.6 cm.  The left breast cancer was a moderately differentiated cancer measuring 2.1 cm with 1 negative sentinel lymph node.  Both tumors were pathologic prognostic stage IA breast cancers.  She had no extensive intraductal component.  She was seen by Dr. Small and underwent an Oncotype and the right breast cancer had an Oncotype score of 13 and the left breast cancer had an Oncotype score 14.  She was just kept on Arimidex and did undergo external beam radiation over the right breast through Dr. Zuleta which finished on August 3, 2020.  She underwent a bilateral mammography and ultrasound at [facility] on April 5, 2021 showing bilateral scarring changes from her partial mastectomies and some nodularity around the 8:00 region of the right breast measuring about 6 mm in size 5 cm from the nipple for which ultrasound-guided core biopsy performed on April 13, 2021 just showing fibroadipose tissue with fat necrosis and granulation tissue.  The patient underwent her last bilateral mammography and ultrasound which was reviewed from October 27, 2022 performed at [place] showing stable changes.   On exam, she has some fibrosis underneath both wide excision scars with some nodularity underneath the right breast upper outer quadrant incision due to the intraoperative and external beam radiation over the right breast.  She was reassured and should follow-up again in 6 months in October 2023.  She will be due for her next bilateral mammography and ultrasound in October 2023 and she was given prescriptions.  She will remain on Arimidex and continue follow up with Dr. Small.

## 2023-04-19 NOTE — HISTORY OF PRESENT ILLNESS
[FreeTextEntry1] : The patient is a 73-year-old postmenopausal nulliparous white female of Mohawk, Pashto, and Luxem[place]ish descent.  She underwent menopause at age 51 and never took any hormone replacement therapy.  She never had any breast biopsies or any atypia.  She has no family history of breast or ovarian cancer.  She felt a palpable mass in the upper inner aspect of the left breast in December 2019 and underwent a bilateral mammography on February 3, 2020 at [facility] showing suspicious spiculated densities in the left breast upper inner quadrant and right breast upper outer quadrant.  Diagnostic mammography and ultrasound performed on February 5, 2020 showed a left breast 2.9 x 2.1 x 1.9 cm mass in the left breast 11:00 region 9 cm from the nipple and a right breast 1.3 x 1.1 x 1.4 cm irregular mass in the 11 o'clock position 9 cm from the nipple.  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  Both tumors were ER/[place] positive HER-2/galo negative.  MRI showed localized disease and her surgery was delayed due to the COVID crisis and she was placed on Arimidex.   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  We took her to the operating room on May 12, 2020 for bilateral partial mastectomies, bilateral sentinel lymph node biopsies, and bilateral intraoperative radiation.  Final pathology on the right showed a 1.6 cm invasive duct cancer with 1 out of 2 positive sentinel nodes with macrometastasis measuring 1.6 cm.  The left breast cancer was a moderately differentiated cancer measuring 2.1 cm with 1 negative sentinel lymph node.  Both tumors were pathologic prognostic stage IA breast cancers.  She had no extensive intraductal component.  She was seen by Dr. Small and underwent an Oncotype in the right breast cancer had an Oncotype score of 13 and the left breast cancer had an Oncotype score 14.  She was just kept on Arimidex and did undergo external beam radiation over the right breast through Dr. Zuleta to which finished on August 3, 2020.  She underwent a bilateral mammography and ultrasound on April 5, 2021 which showed a 6 mm hypoechoic density around the right breast 8-9 o'clock region 5 cm from the nipple for which ultrasound-guided core biopsy was performed on April 13, 2021 just showing fibroadipose tissue with fat necrosis and granulation tissue.  She comes in now for routine follow-up and continues to get yearly mammography and ultrasound.

## 2023-04-19 NOTE — PHYSICAL EXAM
[Normocephalic] : normocephalic [Atraumatic] : atraumatic [EOMI] : extra ocular movement intact [Supple] : supple [No Supraclavicular Adenopathy] : no supraclavicular adenopathy [No Cervical Adenopathy] : no cervical adenopathy [Examined in the supine and seated position] : examined in the supine and seated position [No dominant masses] : no dominant masses in right breast  [No dominant masses] : no dominant masses left breast [No Nipple Retraction] : no left nipple retraction [No Nipple Discharge] : no left nipple discharge [No Axillary Lymphadenopathy] : no left axillary lymphadenopathy [No Edema] : no edema [No Rashes] : no rashes [No Ulceration] : no ulceration [Breast Nipple Inversion] : nipples not inverted [Breast Nipple Retraction] : nipples not retracted [Breast Nipple Flattening] : nipples not flattened [Breast Nipple Fissures] : nipples not fissured [Breast Abnormal Lactation (Galactorrhea)] : no galactorrhea [Breast Abnormal Secretion Bloody Fluid] : no bloody discharge [Breast Abnormal Secretion Serous Fluid] : no serous discharge [Breast Abnormal Secretion Opalescent Fluid] : no milky discharge [de-identified] : On exam, the patient has a obvious bilateral partial mastectomy incisions in the upper inner aspect of the left breast and upper outer aspect of the right breast.  On palpation, she has typical scarring changes underneath the wide excision with some more nodularity underneath the right breast incision.  She has no axillary, supraclavicular, or cervical adenopathy. [de-identified] : Right breast partial mastectomy incision with  moderate fibrosis in the upper outer quadrant secondary to intraoperative and external beam radiation. [de-identified] : Left breast partial mastectomy incision with mild fibrosis secondary to intraoperative radiation. [de-identified] : Bilateral fibrosis underneath the partial mastectomy incisions secondary to intraoperative radiation

## 2023-10-01 PROBLEM — Z92.3 HISTORY OF RADIATION THERAPY: Status: RESOLVED | Noted: 2021-03-01 | Resolved: 2023-10-01

## 2023-10-30 ENCOUNTER — RESULT REVIEW (OUTPATIENT)
Age: 73
End: 2023-10-30

## 2023-10-30 ENCOUNTER — APPOINTMENT (OUTPATIENT)
Dept: BREAST CENTER | Facility: CLINIC | Age: 73
End: 2023-10-30
Payer: MEDICARE

## 2023-10-30 VITALS — HEIGHT: 63.5 IN | BODY MASS INDEX: 33.07 KG/M2 | WEIGHT: 189 LBS

## 2023-10-30 DIAGNOSIS — R92.1 MAMMOGRAPHIC CALCIFICATION FOUND ON DIAGNOSTIC IMAGING OF BREAST: ICD-10-CM

## 2023-10-30 DIAGNOSIS — R92.8 OTHER ABNORMAL AND INCONCLUSIVE FINDINGS ON DIAGNOSTIC IMAGING OF BREAST: ICD-10-CM

## 2023-10-30 PROCEDURE — 99213 OFFICE O/P EST LOW 20 MIN: CPT

## 2023-11-03 ENCOUNTER — RESULT REVIEW (OUTPATIENT)
Age: 73
End: 2023-11-03

## 2023-11-05 ENCOUNTER — NON-APPOINTMENT (OUTPATIENT)
Age: 73
End: 2023-11-05

## 2024-04-08 ENCOUNTER — NON-APPOINTMENT (OUTPATIENT)
Age: 74
End: 2024-04-08

## 2024-04-09 DIAGNOSIS — Z15.89 GENETIC SUSCEPTIBILITY TO OTHER DISEASE: ICD-10-CM

## 2024-04-24 NOTE — ASSESSMENT
[FreeTextEntry1] : The patient is a 74-year-old postmenopausal nulliparous white female of Y[place]ut, Telugu, and Japanese descent. She underwent menopause at age 51 and never took any hormone replacement therapy. She never had any breast biopsies or any atypia. She has no family history of breast or ovarian cancer. She felt a palpable mass in the upper inner aspect of the left breast in December 2019 and underwent a bilateral mammography on February 3, 2020 at [facility] showing suspicious spiculated densities in the left breast upper inner quadrant and right breast upper outer quadrant. Diagnostic mammography and ultrasound performed on February 5, 2020 showed a left breast 2.9 x 2.1 x 1.9 cm mass in the left breast 11:00 region 9 cm from the nipple and a right breast 1.3 x 1.1 x 1.4 cm irregular mass in the 11 o'clock position 9 cm from the nipple. 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 Both tumors were ER/[place] positive HER-2/galo negative. MRI showed localized disease and her surgery was delayed due to the COVID crisis and she was placed on Arimidex. 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 We took her to the operating room on May 12, 2020 for bilateral partial mastectomies, bilateral sentinel lymph node biopsies, and bilateral intraoperative radiation. Final pathology on the right showed a 1.6 cm invasive duct cancer with 1 out of 2 positive sentinel nodes with macrometastasis measuring 1.6 cm. The left breast cancer was a moderately differentiated cancer measuring 2.1 cm with 1 negative sentinel lymph node. Both tumors were pathologic prognostic stage IA breast cancers. She had no extensive intraductal component. She was seen by Dr. Small and underwent an Oncotype and the right breast cancer had an Oncotype score of 13 and the left breast cancer had an Oncotype score 14. She was just kept on Arimidex and did undergo external beam radiation over the right breast through Dr. Zuleta which finished on August 3, 2020. She underwent a bilateral mammography and ultrasound at [facility] on April 5, 2021 showing bilateral scarring changes from her partial mastectomies and some nodularity around the 8:00 region of the right breast measuring about 6 mm in size 5 cm from the nipple for which ultrasound-guided core biopsy performed on April 13, 2021 just showing fibroadipose tissue with fat necrosis and granulation tissue. The patient underwent her last bilateral mammography and ultrasound which was reviewed from October 30, 2023 performed at [place] showing some focal asymmetry and coarse calcifications in the right breast 6:00 region and stereotactic core biopsy performed on November 3, 2023 just showed a benign fibroadenoma. On exam, she has some fibrosis underneath both wide excision scars with some nodularity underneath the right breast upper outer quadrant incision due to the intraoperative and external beam radiation over the right breast.  She has no evidence of recurrence in either breast.  She was reassured and she should follow-up again in 6 months in October 2024.  She will be due for her next bilateral mammography and ultrasound in October 2024 and was given prescriptions.  She will remain on Arimidex and continue follow up with Dr. Small.

## 2024-04-24 NOTE — HISTORY OF PRESENT ILLNESS
[FreeTextEntry1] : The patient is a 74-year-old postmenopausal nulliparous white female of Azeri, Luxembourgish, and [place]ian descent.  She underwent menopause at age 51 and never took any hormone replacement therapy.  She never had any breast biopsies or any atypia.  She has no family history of breast or ovarian cancer.  She felt a palpable mass in the upper inner aspect of the left breast in December 2019 and underwent a bilateral mammography on February 3, 2020 at [facility] showing suspicious spiculated densities in the left breast upper inner quadrant and right breast upper outer quadrant.  Diagnostic mammography and ultrasound performed on February 5, 2020 showed a left breast 2.9 x 2.1 x 1.9 cm mass in the left breast 11:00 region 9 cm from the nipple and a right breast 1.3 x 1.1 x 1.4 cm irregular mass in the 11 o'clock position 9 cm from the nipple.  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  Both tumors were ER/[place] positive HER-2/galo negative.  MRI showed localized disease and her surgery was delayed due to the COVID crisis and she was placed on Arimidex.   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  We took her to the operating room on May 12, 2020 for bilateral partial mastectomies, bilateral sentinel lymph node biopsies, and bilateral intraoperative radiation.  Final pathology on the right showed a 1.6 cm invasive duct cancer with 1 out of 2 positive sentinel nodes with macrometastasis measuring 1.6 cm.  The left breast cancer was a moderately differentiated cancer measuring 2.1 cm with 1 negative sentinel lymph node.  Both tumors were pathologic prognostic stage IA breast cancers.  She had no extensive intraductal component.  She was seen by Dr. Small and underwent an Oncotype in the right breast cancer had an Oncotype score of 13 and the left breast cancer had an Oncotype score 14.  She was just kept on Arimidex and did undergo external beam radiation over the right breast through Dr. Zuleta to which finished on August 3, 2020.  She underwent a bilateral mammography and ultrasound on April 5, 2021 which showed a 6 mm hypoechoic density around the right breast 8-9 o'clock region 5 cm from the nipple for which ultrasound-guided core biopsy was performed on April 13, 2021 just showing fibroadipose tissue with fat necrosis and granulation tissue.  Right breast stereotactic 6:00 biopsy was performed on November 3, 2023 showing a fibroadenoma.  She comes in now for routine follow-up and continues to get yearly mammography and ultrasound.

## 2024-04-24 NOTE — PHYSICAL EXAM
[Normocephalic] : normocephalic [Atraumatic] : atraumatic [EOMI] : extra ocular movement intact [Supple] : supple [No Supraclavicular Adenopathy] : no supraclavicular adenopathy [No Cervical Adenopathy] : no cervical adenopathy [Examined in the supine and seated position] : examined in the supine and seated position [No dominant masses] : no dominant masses in right breast  [No dominant masses] : no dominant masses left breast [No Nipple Retraction] : no left nipple retraction [No Nipple Discharge] : no left nipple discharge [No Axillary Lymphadenopathy] : no left axillary lymphadenopathy [No Edema] : no edema [No Rashes] : no rashes [No Ulceration] : no ulceration [Breast Nipple Inversion] : nipples not inverted [Breast Nipple Retraction] : nipples not retracted [Breast Nipple Flattening] : nipples not flattened [Breast Nipple Fissures] : nipples not fissured [Breast Abnormal Lactation (Galactorrhea)] : no galactorrhea [Breast Abnormal Secretion Bloody Fluid] : no bloody discharge [Breast Abnormal Secretion Serous Fluid] : no serous discharge [Breast Abnormal Secretion Opalescent Fluid] : no milky discharge [de-identified] : On exam, the patient has a obvious bilateral partial mastectomy incisions in the upper inner aspect of the left breast and upper outer aspect of the right breast.  On palpation, she has typical scarring changes underneath the wide excision with some more nodularity underneath the right breast incision.  She has no axillary, supraclavicular, or cervical adenopathy. [de-identified] : Right breast partial mastectomy incision with  moderate fibrosis in the upper outer quadrant secondary to intraoperative and external beam radiation. [de-identified] : Left breast partial mastectomy incision with mild fibrosis secondary to intraoperative radiation. [de-identified] : Bilateral fibrosis underneath the partial mastectomy incisions secondary to intraoperative radiation

## 2024-05-01 ENCOUNTER — APPOINTMENT (OUTPATIENT)
Dept: BREAST CENTER | Facility: CLINIC | Age: 74
End: 2024-05-01
Payer: MEDICARE

## 2024-05-01 VITALS — HEIGHT: 63 IN | BODY MASS INDEX: 33.13 KG/M2 | WEIGHT: 187 LBS

## 2024-05-01 DIAGNOSIS — Z85.3 PERSONAL HISTORY OF MALIGNANT NEOPLASM OF BREAST: ICD-10-CM

## 2024-05-01 DIAGNOSIS — Z90.13 ACQUIRED ABSENCE OF BILATERAL BREASTS AND NIPPLES: ICD-10-CM

## 2024-05-01 DIAGNOSIS — R92.30 DENSE BREASTS, UNSPECIFIED: ICD-10-CM

## 2024-05-01 DIAGNOSIS — Z12.39 ENCOUNTER FOR OTHER SCREENING FOR MALIGNANT NEOPLASM OF BREAST: ICD-10-CM

## 2024-05-01 PROCEDURE — 99213 OFFICE O/P EST LOW 20 MIN: CPT

## 2024-05-01 PROCEDURE — G2211 COMPLEX E/M VISIT ADD ON: CPT

## 2024-05-01 NOTE — PHYSICAL EXAM
[Normocephalic] : normocephalic [Atraumatic] : atraumatic [EOMI] : extra ocular movement intact [Supple] : supple [No Supraclavicular Adenopathy] : no supraclavicular adenopathy [No Cervical Adenopathy] : no cervical adenopathy [Examined in the supine and seated position] : examined in the supine and seated position [No dominant masses] : no dominant masses in right breast  [No dominant masses] : no dominant masses left breast [No Nipple Retraction] : no left nipple retraction [No Nipple Discharge] : no left nipple discharge [No Axillary Lymphadenopathy] : no left axillary lymphadenopathy [No Edema] : no edema [No Rashes] : no rashes [No Ulceration] : no ulceration [Breast Nipple Inversion] : nipples not inverted [Breast Nipple Retraction] : nipples not retracted [Breast Nipple Flattening] : nipples not flattened [Breast Nipple Fissures] : nipples not fissured [Breast Abnormal Lactation (Galactorrhea)] : no galactorrhea [Breast Abnormal Secretion Bloody Fluid] : no bloody discharge [Breast Abnormal Secretion Serous Fluid] : no serous discharge [Breast Abnormal Secretion Opalescent Fluid] : no milky discharge [de-identified] : On exam, the patient has a obvious bilateral partial mastectomy incisions in the upper inner aspect of the left breast and upper outer aspect of the right breast.  On palpation, she has typical scarring changes underneath the wide excision with some more nodularity underneath the right breast incision.  She has no axillary, supraclavicular, or cervical adenopathy. [de-identified] : Right breast partial mastectomy incision with  moderate fibrosis in the upper outer quadrant secondary to intraoperative and external beam radiation. [de-identified] : Bilateral fibrosis underneath the partial mastectomy incisions secondary to intraoperative radiation [de-identified] : Left breast partial mastectomy incision with mild fibrosis secondary to intraoperative radiation.

## 2024-05-01 NOTE — ASSESSMENT
[FreeTextEntry1] : The patient is a 74-year-old postmenopausal nulliparous white female of Kiny[place], Amharic, and Ukrainian descent. She underwent menopause at age 51 and never took any hormone replacement therapy. She never had any breast biopsies or any atypia. She has no family history of breast or ovarian cancer. She felt a palpable mass in the upper inner aspect of the left breast in December 2019 and underwent a bilateral mammography on February 3, 2020 at [facility] showing suspicious spiculated densities in the left breast upper inner quadrant and right breast upper outer quadrant. Diagnostic mammography and ultrasound performed on February 5, 2020 showed a left breast 2.9 x 2.1 x 1.9 cm mass in the left breast 11:00 region 9 cm from the nipple and a right breast 1.3 x 1.1 x 1.4 cm irregular mass in the 11 o'clock position 9 cm from the nipple. 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 Both tumors were ER/[place] positive HER-2/galo negative. MRI showed localized disease and her surgery was delayed due to the COVID crisis and she was placed on Arimidex. 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 We took her to the operating room on May 12, 2020 for bilateral partial mastectomies, bilateral sentinel lymph node biopsies, and bilateral intraoperative radiation. Final pathology on the right showed a 1.6 cm invasive duct cancer with 1 out of 2 positive sentinel nodes with macrometastasis measuring 1.6 cm. The left breast cancer was a moderately differentiated cancer measuring 2.1 cm with 1 negative sentinel lymph node. Both tumors were pathologic prognostic stage IA breast cancers. She had no extensive intraductal component. She was seen by Dr. Small and underwent an Oncotype and the right breast cancer had an Oncotype score of 13 and the left breast cancer had an Oncotype score 14. She was just kept on Arimidex and did undergo external beam radiation over the right breast through Dr. Zuleta which finished on August 3, 2020. She underwent a bilateral mammography and ultrasound at [facility] on April 5, 2021 showing bilateral scarring changes from her partial mastectomies and some nodularity around the 8:00 region of the right breast measuring about 6 mm in size 5 cm from the nipple for which ultrasound-guided core biopsy performed on April 13, 2021 just showing fibroadipose tissue with fat necrosis and granulation tissue. The patient underwent her last bilateral mammography and ultrasound which was reviewed from October 30, 2023 performed at [place] showing some focal asymmetry and coarse calcifications in the right breast 6:00 region and stereotactic core biopsy performed on November 3, 2023 just showed a benign fibroadenoma. On exam, she has some fibrosis underneath both wide excision scars with some nodularity underneath the right breast upper outer quadrant incision due to the intraoperative and external beam radiation over the right breast.  She has no evidence of recurrence in either breast.  She was reassured and she should follow-up again in 6 months in October 2024.  She will be due for her next bilateral mammography and ultrasound in October 2024 and was given prescriptions.  She will remain on Arimidex and continue follow up with Dr. Small.

## 2024-05-01 NOTE — HISTORY OF PRESENT ILLNESS
[FreeTextEntry1] : The patient is a 74-year-old postmenopausal nulliparous white female of Swedish, Syriac, and Lao descent.  She underwent menopause at age 51 and never took any hormone replacement therapy.  She never had any breast biopsies or any atypia.  She has no family history of breast or ovarian cancer.  She felt a palpable mass in the upper inner aspect of the left breast in December 2019 and underwent a bilateral mammography on February 3, 2020 at [facility] showing suspicious spiculated densities in the left breast upper inner quadrant and right breast upper outer quadrant.  Diagnostic mammography and ultrasound performed on February 5, 2020 showed a left breast 2.9 x 2.1 x 1.9 cm mass in the left breast 11:00 region 9 cm from the nipple and a right breast 1.3 x 1.1 x 1.4 cm irregular mass in the 11 o'clock position 9 cm from the nipple.  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  Both tumors were ER/[place] positive HER-2/galo negative.  MRI showed localized disease and her surgery was delayed due to the COVID crisis and she was placed on Arimidex.   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  We took her to the operating room on May 12, 2020 for bilateral partial mastectomies, bilateral sentinel lymph node biopsies, and bilateral intraoperative radiation.  Final pathology on the right showed a 1.6 cm invasive duct cancer with 1 out of 2 positive sentinel nodes with macrometastasis measuring 1.6 cm.  The left breast cancer was a moderately differentiated cancer measuring 2.1 cm with 1 negative sentinel lymph node.  Both tumors were pathologic prognostic stage IA breast cancers.  She had no extensive intraductal component.  She was seen by Dr. Small and underwent an Oncotype in the right breast cancer had an Oncotype score of 13 and the left breast cancer had an Oncotype score 14.  She was just kept on Arimidex and did undergo external beam radiation over the right breast through Dr. Zuleta to which finished on August 3, 2020.  She underwent a bilateral mammography and ultrasound on April 5, 2021 which showed a 6 mm hypoechoic density around the right breast 8-9 o'clock region 5 cm from the nipple for which ultrasound-guided core biopsy was performed on April 13, 2021 just showing fibroadipose tissue with fat necrosis and granulation tissue.  Right breast stereotactic 6:00 biopsy was performed on November 3, 2023 showing a fibroadenoma.  She comes in now for routine follow-up and continues to get yearly mammography and ultrasound.

## 2024-10-19 ENCOUNTER — NON-APPOINTMENT (OUTPATIENT)
Age: 74
End: 2024-10-19

## 2024-11-08 ENCOUNTER — APPOINTMENT (OUTPATIENT)
Dept: BREAST CENTER | Facility: CLINIC | Age: 74
End: 2024-11-08
Payer: MEDICARE

## 2024-11-08 ENCOUNTER — RESULT REVIEW (OUTPATIENT)
Age: 74
End: 2024-11-08

## 2024-11-08 DIAGNOSIS — Z90.13 ACQUIRED ABSENCE OF BILATERAL BREASTS AND NIPPLES: ICD-10-CM

## 2024-11-08 DIAGNOSIS — Z12.39 ENCOUNTER FOR OTHER SCREENING FOR MALIGNANT NEOPLASM OF BREAST: ICD-10-CM

## 2024-11-08 DIAGNOSIS — R92.30 DENSE BREASTS, UNSPECIFIED: ICD-10-CM

## 2024-11-08 DIAGNOSIS — Z85.3 PERSONAL HISTORY OF MALIGNANT NEOPLASM OF BREAST: ICD-10-CM

## 2024-11-08 PROCEDURE — 99213 OFFICE O/P EST LOW 20 MIN: CPT

## 2024-11-08 PROCEDURE — G2211 COMPLEX E/M VISIT ADD ON: CPT

## 2025-03-28 ENCOUNTER — NON-APPOINTMENT (OUTPATIENT)
Age: 75
End: 2025-03-28

## 2025-05-07 ENCOUNTER — NON-APPOINTMENT (OUTPATIENT)
Age: 75
End: 2025-05-07

## 2025-05-09 ENCOUNTER — APPOINTMENT (OUTPATIENT)
Dept: BREAST CENTER | Facility: CLINIC | Age: 75
End: 2025-05-09
Payer: MEDICARE

## 2025-05-09 VITALS — HEIGHT: 63.5 IN | BODY MASS INDEX: 31.32 KG/M2 | HEART RATE: 107 BPM | OXYGEN SATURATION: 99 % | WEIGHT: 179 LBS

## 2025-05-09 DIAGNOSIS — Z12.39 ENCOUNTER FOR OTHER SCREENING FOR MALIGNANT NEOPLASM OF BREAST: ICD-10-CM

## 2025-05-09 DIAGNOSIS — Z85.3 PERSONAL HISTORY OF MALIGNANT NEOPLASM OF BREAST: ICD-10-CM

## 2025-05-09 DIAGNOSIS — Z90.13 ACQUIRED ABSENCE OF BILATERAL BREASTS AND NIPPLES: ICD-10-CM

## 2025-05-09 PROCEDURE — 99213 OFFICE O/P EST LOW 20 MIN: CPT
